# Patient Record
(demographics unavailable — no encounter records)

---

## 2025-02-13 NOTE — END OF VISIT
[FreeTextEntry3] : I saw and evaluated the patient and discussed the care with the NP provider above on 02/13/2025 . I agree with the findings and plan as documented in the note above.  She has a history of gestational hypertension during her first pregnancy.  She is now 18 weeks of gestation with her second pregnancy.  Blood pressure is controlled.  She will take aspirin 81 mg daily.  Will monitor her blood pressure at home.

## 2025-02-13 NOTE — PHYSICAL EXAM
[Well Developed] : well developed [Well Nourished] : well nourished [No Acute Distress] : no acute distress [Normal Conjunctiva] : normal conjunctiva [Normal Venous Pressure] : normal venous pressure [No Carotid Bruit] : no carotid bruit [Normal S1, S2] : normal S1, S2 [No Rub] : no rub [No Gallop] : no gallop [Clear Lung Fields] : clear lung fields [Good Air Entry] : good air entry [No Respiratory Distress] : no respiratory distress  [Soft] : abdomen soft [Non Tender] : non-tender [No Masses/organomegaly] : no masses/organomegaly [Normal Bowel Sounds] : normal bowel sounds [Normal Gait] : normal gait [No Edema] : no edema [No Cyanosis] : no cyanosis [No Clubbing] : no clubbing [No Varicosities] : no varicosities [No Rash] : no rash [No Skin Lesions] : no skin lesions [Moves all extremities] : moves all extremities [No Focal Deficits] : no focal deficits [Normal Speech] : normal speech [Alert and Oriented] : alert and oriented [Normal memory] : normal memory [Rhythm Regular] : regular [Normal S1] : normal S1 [Normal S2] : normal S2 [No Murmur] : no murmurs heard [No Pitting Edema] : no pitting edema present [No Abnormalities] : the abdominal aorta was not enlarged and no bruit was heard [Right Carotid Bruit] : no bruit heard over the right carotid [Left Carotid Bruit] : no bruit heard over the left carotid [de-identified] : gravid uterus

## 2025-02-13 NOTE — HISTORY OF PRESENT ILLNESS
[FreeTextEntry1] : 31 year old woman with no PMH presents for a follow up cardiac evaluation.   A1 She was last seen in  at her 34th week gestation of pregnancy. She had been monitored for gestational HTN.  She had a miscarriage at 6 weeks with her 1st pregnancy.   She is now 18 weeks with 2nd pregnancy.  Her blood pressure have been 120s/80 at home.  She   denies any chest pain, dyspnea,  PND, orthopnea, lower extremity edema, near syncope, syncope, strokelike symptoms.  She denies any blurry vision, headaches. She is stay well hydrated (120oz) She is taking a daily ASA 81

## 2025-02-13 NOTE — PHYSICAL EXAM
[Well Developed] : well developed [Well Nourished] : well nourished [No Acute Distress] : no acute distress [Normal Conjunctiva] : normal conjunctiva [Normal Venous Pressure] : normal venous pressure [No Carotid Bruit] : no carotid bruit [Normal S1, S2] : normal S1, S2 [No Rub] : no rub [No Gallop] : no gallop [Clear Lung Fields] : clear lung fields [Good Air Entry] : good air entry [No Respiratory Distress] : no respiratory distress  [Soft] : abdomen soft [Non Tender] : non-tender [No Masses/organomegaly] : no masses/organomegaly [Normal Bowel Sounds] : normal bowel sounds [Normal Gait] : normal gait [No Edema] : no edema [No Cyanosis] : no cyanosis [No Clubbing] : no clubbing [No Varicosities] : no varicosities [No Rash] : no rash [No Skin Lesions] : no skin lesions [Moves all extremities] : moves all extremities [No Focal Deficits] : no focal deficits [Normal Speech] : normal speech [Alert and Oriented] : alert and oriented [Normal memory] : normal memory [Rhythm Regular] : regular [Normal S1] : normal S1 [Normal S2] : normal S2 [No Murmur] : no murmurs heard [No Pitting Edema] : no pitting edema present [No Abnormalities] : the abdominal aorta was not enlarged and no bruit was heard [Right Carotid Bruit] : no bruit heard over the right carotid [Left Carotid Bruit] : no bruit heard over the left carotid [de-identified] : gravid uterus

## 2025-02-13 NOTE — DISCUSSION/SUMMARY
[EKG obtained to assist in diagnosis and management of assessed problem(s)] : EKG obtained to assist in diagnosis and management of assessed problem(s) [FreeTextEntry1] : 31 year woman with a history as listed presents for a followup cardiac evaluation.  Tavia is now 18 weeks pregnant with her 2nd pregnancy.  She had developed gestational hypertension that is controlled without medications at the time of her first pregnancy. she arrives today feeling well. Appears euvolemic on exam. Her blood pressure is controlled and has been controlled at home. Her EKG did not reveal any significant ischemic changes.  For now she will continue to monitor her B/P daily and maintain a log. she will be mindful of proper hydration and reducing dietary salt intake. She will undergo an echocardiogram after her upcoming anatomy scan to assess cardiac structure and function for abnormalities.  She will continue ASA  she will follow up with me between 26-28 weeks gestation.